# Patient Record
Sex: MALE | Race: WHITE | NOT HISPANIC OR LATINO | URBAN - METROPOLITAN AREA
[De-identification: names, ages, dates, MRNs, and addresses within clinical notes are randomized per-mention and may not be internally consistent; named-entity substitution may affect disease eponyms.]

---

## 2023-04-07 ENCOUNTER — LAB (OUTPATIENT)
Dept: LAB | Facility: LAB | Age: 48
End: 2023-04-07
Payer: COMMERCIAL

## 2023-04-07 ENCOUNTER — OFFICE VISIT (OUTPATIENT)
Dept: PRIMARY CARE | Facility: CLINIC | Age: 48
End: 2023-04-07
Payer: COMMERCIAL

## 2023-04-07 VITALS
HEART RATE: 88 BPM | DIASTOLIC BLOOD PRESSURE: 81 MMHG | BODY MASS INDEX: 40.32 KG/M2 | WEIGHT: 266 LBS | SYSTOLIC BLOOD PRESSURE: 145 MMHG | HEIGHT: 68 IN

## 2023-04-07 DIAGNOSIS — Z00.00 PHYSICAL EXAM: ICD-10-CM

## 2023-04-07 DIAGNOSIS — G89.29 CHRONIC BILATERAL LOW BACK PAIN WITHOUT SCIATICA: Primary | ICD-10-CM

## 2023-04-07 DIAGNOSIS — M54.50 CHRONIC BILATERAL LOW BACK PAIN WITHOUT SCIATICA: ICD-10-CM

## 2023-04-07 DIAGNOSIS — M62.830 LUMBAR PARASPINAL MUSCLE SPASM: ICD-10-CM

## 2023-04-07 DIAGNOSIS — Z13.29 SCREENING FOR THYROID DISORDER: ICD-10-CM

## 2023-04-07 DIAGNOSIS — Z12.11 SCREENING FOR COLON CANCER: ICD-10-CM

## 2023-04-07 DIAGNOSIS — E66.01 MORBIDLY OBESE (MULTI): ICD-10-CM

## 2023-04-07 DIAGNOSIS — M54.9 MID BACK PAIN: ICD-10-CM

## 2023-04-07 DIAGNOSIS — Z13.89 SCREENING FOR OBESITY: ICD-10-CM

## 2023-04-07 DIAGNOSIS — M54.50 CHRONIC BILATERAL LOW BACK PAIN WITHOUT SCIATICA: Primary | ICD-10-CM

## 2023-04-07 DIAGNOSIS — G89.29 CHRONIC BILATERAL LOW BACK PAIN WITHOUT SCIATICA: ICD-10-CM

## 2023-04-07 DIAGNOSIS — Z00.00 PHYSICAL EXAM: Primary | ICD-10-CM

## 2023-04-07 LAB
ALANINE AMINOTRANSFERASE (SGPT) (U/L) IN SER/PLAS: 22 U/L (ref 10–52)
ALBUMIN (G/DL) IN SER/PLAS: 4.1 G/DL (ref 3.4–5)
ALKALINE PHOSPHATASE (U/L) IN SER/PLAS: 65 U/L (ref 33–120)
ANION GAP IN SER/PLAS: 11 MMOL/L (ref 10–20)
ASPARTATE AMINOTRANSFERASE (SGOT) (U/L) IN SER/PLAS: 15 U/L (ref 9–39)
BILIRUBIN TOTAL (MG/DL) IN SER/PLAS: 0.6 MG/DL (ref 0–1.2)
CALCIUM (MG/DL) IN SER/PLAS: 9.4 MG/DL (ref 8.6–10.6)
CARBON DIOXIDE, TOTAL (MMOL/L) IN SER/PLAS: 30 MMOL/L (ref 21–32)
CHLORIDE (MMOL/L) IN SER/PLAS: 104 MMOL/L (ref 98–107)
CHOLESTEROL (MG/DL) IN SER/PLAS: 197 MG/DL (ref 0–199)
CHOLESTEROL IN HDL (MG/DL) IN SER/PLAS: 33 MG/DL
CHOLESTEROL/HDL RATIO: 6
CREATININE (MG/DL) IN SER/PLAS: 0.86 MG/DL (ref 0.5–1.3)
GFR MALE: >90 ML/MIN/1.73M2
GLUCOSE (MG/DL) IN SER/PLAS: 84 MG/DL (ref 74–99)
LDL: 133 MG/DL (ref 0–99)
POTASSIUM (MMOL/L) IN SER/PLAS: 4.1 MMOL/L (ref 3.5–5.3)
PROTEIN TOTAL: 7.5 G/DL (ref 6.4–8.2)
SODIUM (MMOL/L) IN SER/PLAS: 141 MMOL/L (ref 136–145)
TRIGLYCERIDE (MG/DL) IN SER/PLAS: 155 MG/DL (ref 0–149)
UREA NITROGEN (MG/DL) IN SER/PLAS: 11 MG/DL (ref 6–23)
VLDL: 31 MG/DL (ref 0–40)

## 2023-04-07 PROCEDURE — 36415 COLL VENOUS BLD VENIPUNCTURE: CPT

## 2023-04-07 PROCEDURE — 84443 ASSAY THYROID STIM HORMONE: CPT

## 2023-04-07 PROCEDURE — 80053 COMPREHEN METABOLIC PANEL: CPT

## 2023-04-07 PROCEDURE — 99215 OFFICE O/P EST HI 40 MIN: CPT | Performed by: INTERNAL MEDICINE

## 2023-04-07 PROCEDURE — 80061 LIPID PANEL: CPT

## 2023-04-07 PROCEDURE — 84153 ASSAY OF PSA TOTAL: CPT

## 2023-04-07 PROCEDURE — 99417 PROLNG OP E/M EACH 15 MIN: CPT | Performed by: INTERNAL MEDICINE

## 2023-04-07 RX ORDER — METHOCARBAMOL 750 MG/1
750 TABLET, FILM COATED ORAL DAILY PRN
Qty: 15 TABLET | Refills: 2 | Status: SHIPPED | OUTPATIENT
Start: 2023-04-07

## 2023-04-07 RX ORDER — METHOCARBAMOL 750 MG/1
750 TABLET, FILM COATED ORAL DAILY PRN
COMMUNITY
Start: 2023-01-23 | End: 2023-04-07 | Stop reason: SDUPTHER

## 2023-04-07 ASSESSMENT — ENCOUNTER SYMPTOMS
CONSTITUTIONAL NEGATIVE: 1
PARESTHESIAS: 0
NUMBNESS: 0
PARESIS: 0
BACK PAIN: 1
ABDOMINAL PAIN: 0
BOWEL INCONTINENCE: 0

## 2023-04-07 NOTE — PROGRESS NOTES
"Patient ID: Kirby Hameed is a 47 y.o. male who presents for Annual Exam (cpe).    /81   Pulse 88   Ht 1.727 m (5' 8\")   Wt 121 kg (266 lb)   BMI 40.45 kg/m²     Back Pain  This is a chronic problem. The current episode started more than 1 year ago (for the last 25 yrs). The problem has been waxing and waning since onset. The pain is present in the lumbar spine and thoracic spine. The quality of the pain is described as aching (like spasm , pressure , heaviness). The pain does not radiate. Pain scale: pain scale ranges from 3-10 /10. Pain severity now: mild to severe. The symptoms are aggravated by bending, standing and position. Stiffness is present In the morning. Pertinent negatives include no abdominal pain, bladder incontinence, bowel incontinence, numbness, paresis or paresthesias.         Pt morbidly obese   Have not taken any   Measures to work on his   Overweight         Subjective     Review of Systems   Constitutional: Negative.    Gastrointestinal:  Negative for abdominal pain and bowel incontinence.   Genitourinary:  Negative for bladder incontinence.   Musculoskeletal:  Positive for back pain.   Neurological:  Negative for numbness and paresthesias.   All other systems reviewed and are negative.      Objective     Physical Exam  Vitals and nursing note reviewed.   Constitutional:       Appearance: Normal appearance. He is obese.   Neck:      Vascular: No carotid bruit.   Cardiovascular:      Rate and Rhythm: Normal rate and regular rhythm.      Pulses: Normal pulses.      Heart sounds: Normal heart sounds. No murmur heard.  Pulmonary:      Effort: Pulmonary effort is normal.      Breath sounds: Normal breath sounds.   Abdominal:      Palpations: There is no mass.      Hernia: No hernia is present.   Musculoskeletal:      Cervical back: No rigidity or tenderness.      Right lower leg: No edema.      Left lower leg: No edema.      Comments: Lumbar flexion/extension painful and limited   No " LUMBAR SPINE tenderness  Slr rt+lt normal  Hip normal      Neurological:      Mental Status: He is alert.         Lab Results   Component Value Date    WBC 5.3 05/31/2022    HGB 12.8 (L) 05/31/2022    HCT 37.5 (L) 05/31/2022    MCV 93 05/31/2022     05/31/2022           Problem List Items Addressed This Visit          Musculoskeletal    Lumbar paraspinal muscle spasm       Other    Screening for obesity    Chronic bilateral low back pain without sciatica    Relevant Medications    methocarbamol (Robaxin) 750 mg tablet    Other Relevant Orders    XR lumbar spine complete 4+ views    Referral to Physical Therapy    Mid back pain    Relevant Medications    methocarbamol (Robaxin) 750 mg tablet    Other Relevant Orders    XR thoracic spine complete 4+ views     Other Visit Diagnoses       Physical exam    -  Primary    Relevant Orders    Comprehensive metabolic panel    Lipid panel    Prostate Spec.Ag,Screen    Cologuard® colon cancer screening    Morbidly obese (CMS/HCC)        Relevant Orders    Referral to Nutrition Services    Screening for thyroid disorder        Relevant Orders    Tsh With Reflex To Free T4 If Abnormal    Screening for colon cancer        Relevant Orders    Cologuard® colon cancer screening             A/P         MOIST HEAT TO THE AFFECTED AREA ON THE BACK   WITH MICROWAVE HEATING PAD   BACK STRETCHING EXERCISES  TOLERATED   WHEN PAIN IS NOT SEVERE   CAN USE VOLTAREN GEL 1% AND RUB THIS TOPICALLY ON THE AFFECTED AREA ON THE BACK   TYLENOL 500MG 1 PILL THREE TIMES A DAY ROUND THE CLOCK  NEEDED FOR PAIN   DEMONSTRATED HOW TO STRETCH BACK   METHOCARBAMOL 750MG 1 PILL BID  NEEDED FOR MUSCLE SPASM   DISCUSSED THE IMPORTANCE OF LOOSING WEIGHT   Discussed the importance of obesity on overall physical and mental wellbeing  People are overweight they are at risk for malignancies  People are overweight they have a high risk to develop back pain which can be chronic knee pain and hip pain  They are  at risk for sleep apnea they are at risk for fatal heart arrhythmias  They are also at risk for stroke hypertension diabetes  Will get CMP, lipid, PSA, TSH  Referral to have him seen nutritionist  X-ray lumbar spine  X-ray thoracic spine  Cologuard for colon cancer screening

## 2023-04-08 LAB
PROSTATE SPECIFIC ANTIGEN,SCREEN: 0.93 NG/ML (ref 0–4)
THYROTROPIN (MIU/L) IN SER/PLAS BY DETECTION LIMIT <= 0.05 MIU/L: 1.67 MIU/L (ref 0.44–3.98)

## 2023-04-13 ENCOUNTER — TELEPHONE (OUTPATIENT)
Dept: PRIMARY CARE | Facility: CLINIC | Age: 48
End: 2023-04-13
Payer: COMMERCIAL

## 2023-04-13 NOTE — TELEPHONE ENCOUNTER
----- Message from Nathalia Narayanan MD sent at 4/13/2023  8:38 AM EDT -----  And back x-ray shows arthritis and that very well can explain his pain around the mid upper back in the neck region and lower back I am placing a referral for the physical therapy he will be called with the date time and place where he is going to get that done

## 2023-04-18 ENCOUNTER — TELEPHONE (OUTPATIENT)
Dept: PRIMARY CARE | Facility: CLINIC | Age: 48
End: 2023-04-18
Payer: COMMERCIAL

## 2023-04-18 NOTE — TELEPHONE ENCOUNTER
----- Message from Nathalia Narayanan MD sent at 4/16/2023  7:20 PM EDT -----  Lower back x-ray and neck x-ray shows arthritis he has a physical therapy referral tell him to follow-up with physical therapy

## 2023-05-27 LAB — NONINV COLON CA DNA+OCC BLD SCRN STL QL: NEGATIVE

## 2023-05-31 ENCOUNTER — TELEPHONE (OUTPATIENT)
Dept: PRIMARY CARE | Facility: CLINIC | Age: 48
End: 2023-05-31
Payer: COMMERCIAL

## 2023-05-31 NOTE — TELEPHONE ENCOUNTER
----- Message from Rowan Hernandez LPN sent at 5/31/2023  2:14 PM EDT -----  Left message for patient to return call.

## 2023-06-08 ENCOUNTER — TELEPHONE (OUTPATIENT)
Dept: PRIMARY CARE | Facility: CLINIC | Age: 48
End: 2023-06-08
Payer: COMMERCIAL

## 2023-09-07 ENCOUNTER — TELEPHONE (OUTPATIENT)
Dept: PRIMARY CARE | Facility: CLINIC | Age: 48
End: 2023-09-07
Payer: COMMERCIAL

## 2023-09-07 NOTE — TELEPHONE ENCOUNTER
Pt. Requesting a letter stating it is ok for him to return to work. States he tested positive for Covid on 9/3/23. His symptoms started on Saturday. He had a cough, sneezing, fatigue, and headache. He needs something from his doctor stating he can go back to work. Please advise.

## 2023-09-08 NOTE — TELEPHONE ENCOUNTER
Spoke with patient and advised of message   Pt. Agrees. Asking if he can have a work note stating he may return to work on 9/12/23.

## 2024-05-16 ENCOUNTER — APPOINTMENT (OUTPATIENT)
Dept: PRIMARY CARE | Facility: CLINIC | Age: 49
End: 2024-05-16
Payer: COMMERCIAL

## 2024-06-06 ENCOUNTER — LAB (OUTPATIENT)
Dept: LAB | Facility: LAB | Age: 49
End: 2024-06-06
Payer: COMMERCIAL

## 2024-06-06 ENCOUNTER — OFFICE VISIT (OUTPATIENT)
Dept: PRIMARY CARE | Facility: CLINIC | Age: 49
End: 2024-06-06
Payer: COMMERCIAL

## 2024-06-06 VITALS
BODY MASS INDEX: 42.53 KG/M2 | HEIGHT: 67 IN | SYSTOLIC BLOOD PRESSURE: 136 MMHG | WEIGHT: 271 LBS | OXYGEN SATURATION: 98 % | HEART RATE: 88 BPM | DIASTOLIC BLOOD PRESSURE: 80 MMHG

## 2024-06-06 DIAGNOSIS — Z13.29 SCREENING FOR THYROID DISORDER: ICD-10-CM

## 2024-06-06 DIAGNOSIS — Z13.89 SCREENING FOR OBESITY: ICD-10-CM

## 2024-06-06 DIAGNOSIS — G47.33 OBSTRUCTIVE SLEEP APNEA: ICD-10-CM

## 2024-06-06 DIAGNOSIS — Z00.00 ANNUAL PHYSICAL EXAM: ICD-10-CM

## 2024-06-06 DIAGNOSIS — Z00.00 ANNUAL PHYSICAL EXAM: Primary | ICD-10-CM

## 2024-06-06 DIAGNOSIS — E66.01 CLASS 3 SEVERE OBESITY DUE TO EXCESS CALORIES WITHOUT SERIOUS COMORBIDITY WITH BODY MASS INDEX (BMI) OF 40.0 TO 44.9 IN ADULT (MULTI): ICD-10-CM

## 2024-06-06 PROBLEM — E66.813 CLASS 3 SEVERE OBESITY DUE TO EXCESS CALORIES WITHOUT SERIOUS COMORBIDITY WITH BODY MASS INDEX (BMI) OF 40.0 TO 44.9 IN ADULT: Status: ACTIVE | Noted: 2024-06-06

## 2024-06-06 LAB
ALBUMIN SERPL BCP-MCNC: 4.2 G/DL (ref 3.4–5)
ALP SERPL-CCNC: 53 U/L (ref 33–120)
ALT SERPL W P-5'-P-CCNC: 32 U/L (ref 10–52)
ANION GAP SERPL CALC-SCNC: 13 MMOL/L (ref 10–20)
AST SERPL W P-5'-P-CCNC: 19 U/L (ref 9–39)
BILIRUB SERPL-MCNC: 0.4 MG/DL (ref 0–1.2)
BUN SERPL-MCNC: 9 MG/DL (ref 6–23)
CALCIUM SERPL-MCNC: 9.4 MG/DL (ref 8.6–10.6)
CHLORIDE SERPL-SCNC: 102 MMOL/L (ref 98–107)
CHOLEST SERPL-MCNC: 194 MG/DL (ref 0–199)
CHOLESTEROL/HDL RATIO: 5.7
CO2 SERPL-SCNC: 29 MMOL/L (ref 21–32)
CREAT SERPL-MCNC: 0.93 MG/DL (ref 0.5–1.3)
EGFRCR SERPLBLD CKD-EPI 2021: >90 ML/MIN/1.73M*2
GLUCOSE SERPL-MCNC: 117 MG/DL (ref 74–99)
HDLC SERPL-MCNC: 34 MG/DL
LDLC SERPL CALC-MCNC: 122 MG/DL
NON HDL CHOLESTEROL: 160 MG/DL (ref 0–149)
POTASSIUM SERPL-SCNC: 4.3 MMOL/L (ref 3.5–5.3)
PROT SERPL-MCNC: 7.6 G/DL (ref 6.4–8.2)
PSA SERPL-MCNC: 1.12 NG/ML
SODIUM SERPL-SCNC: 140 MMOL/L (ref 136–145)
TRIGL SERPL-MCNC: 192 MG/DL (ref 0–149)
TSH SERPL-ACNC: 1.98 MIU/L (ref 0.44–3.98)
VLDL: 38 MG/DL (ref 0–40)

## 2024-06-06 PROCEDURE — 3008F BODY MASS INDEX DOCD: CPT | Performed by: INTERNAL MEDICINE

## 2024-06-06 PROCEDURE — 99396 PREV VISIT EST AGE 40-64: CPT | Performed by: INTERNAL MEDICINE

## 2024-06-06 PROCEDURE — 1036F TOBACCO NON-USER: CPT | Performed by: INTERNAL MEDICINE

## 2024-06-06 PROCEDURE — 80061 LIPID PANEL: CPT

## 2024-06-06 PROCEDURE — 84153 ASSAY OF PSA TOTAL: CPT

## 2024-06-06 PROCEDURE — 36415 COLL VENOUS BLD VENIPUNCTURE: CPT

## 2024-06-06 PROCEDURE — 84443 ASSAY THYROID STIM HORMONE: CPT

## 2024-06-06 PROCEDURE — 80053 COMPREHEN METABOLIC PANEL: CPT

## 2024-06-06 ASSESSMENT — ENCOUNTER SYMPTOMS
CONSTITUTIONAL NEGATIVE: 1
SLEEP DISTURBANCE: 1

## 2024-06-06 ASSESSMENT — PATIENT HEALTH QUESTIONNAIRE - PHQ9
SUM OF ALL RESPONSES TO PHQ9 QUESTIONS 1 AND 2: 0
1. LITTLE INTEREST OR PLEASURE IN DOING THINGS: NOT AT ALL
2. FEELING DOWN, DEPRESSED OR HOPELESS: NOT AT ALL

## 2024-06-06 NOTE — PROGRESS NOTES
"Patient ID: Kirby Hameed III is a 49 y.o. male who presents for Annual Exam.    /80   Pulse 88   Ht 1.702 m (5' 7\")   Wt 123 kg (271 lb)   SpO2 98%   BMI 42.44 kg/m²     HPI      I AM HERE FOR CHECK UP       NO COMPLAINTS     NO CONCERNS     I AM GAINING WEIGHT   MAY BE I AM EATING TOO   MUCH , AND NOT BURNING ENOUGH   CALORIES         PT HAS SLEEP APNEA   HE CANNOT AFFORD   CPAP THERAPY   SINCE HIS INSURANCE DOES   NOT COVER THE THERAPY         Subjective     Review of Systems   Constitutional: Negative.    Psychiatric/Behavioral:  Positive for sleep disturbance.    All other systems reviewed and are negative.      Objective     Physical Exam  Vitals and nursing note reviewed.   Constitutional:       Appearance: He is obese.   Neck:      Vascular: No carotid bruit.   Cardiovascular:      Rate and Rhythm: Normal rate and regular rhythm.      Pulses: Normal pulses.      Heart sounds: Normal heart sounds. No murmur heard.  Pulmonary:      Effort: Pulmonary effort is normal.      Breath sounds: Normal breath sounds.   Abdominal:      Palpations: There is no mass.      Tenderness: There is no guarding or rebound.      Hernia: No hernia is present.      Comments: OBESE    Musculoskeletal:      Right lower leg: No edema.      Left lower leg: No edema.   Skin:     Capillary Refill: Capillary refill takes more than 3 seconds.   Neurological:      General: No focal deficit present.      Mental Status: He is oriented to person, place, and time. Mental status is at baseline.   Psychiatric:         Mood and Affect: Mood normal.         Behavior: Behavior normal.         Thought Content: Thought content normal.         Judgment: Judgment normal.         Lab Results   Component Value Date    WBC 5.3 05/31/2022    HGB 12.8 (L) 05/31/2022    HCT 37.5 (L) 05/31/2022    MCV 93 05/31/2022     05/31/2022           Problem List Items Addressed This Visit       Screening for obesity    Class 3 severe obesity due to " excess calories without serious comorbidity with body mass index (BMI) of 40.0 to 44.9 in adult (Multi)    Relevant Orders    Referral to Endocrinology    Obstructive sleep apnea    Relevant Orders    Referral to Endocrinology     Other Visit Diagnoses       Annual physical exam    -  Primary    Relevant Orders    Comprehensive metabolic panel    Lipid panel    Prostate Spec.Ag,Screen    Screening for thyroid disorder        Relevant Orders    Tsh With Reflex To Free T4 If Abnormal                 A/P         CBC,LIPID,PSA, TSH   [facility]   FOLLOW UP IF NEEDED

## 2024-10-09 ENCOUNTER — APPOINTMENT (OUTPATIENT)
Dept: ENDOCRINOLOGY | Facility: CLINIC | Age: 49
End: 2024-10-09
Payer: COMMERCIAL

## 2024-12-19 ENCOUNTER — TELEMEDICINE (OUTPATIENT)
Dept: PRIMARY CARE | Facility: CLINIC | Age: 49
End: 2024-12-19

## 2024-12-19 DIAGNOSIS — R68.2 DRY MOUTH: ICD-10-CM

## 2024-12-19 DIAGNOSIS — Z13.1 SCREENING FOR DIABETES MELLITUS: ICD-10-CM

## 2024-12-19 DIAGNOSIS — R35.0 URINARY FREQUENCY: Primary | Chronic | ICD-10-CM

## 2024-12-19 PROCEDURE — 99212 OFFICE O/P EST SF 10 MIN: CPT | Performed by: INTERNAL MEDICINE

## 2024-12-19 PROCEDURE — 1036F TOBACCO NON-USER: CPT | Performed by: INTERNAL MEDICINE

## 2024-12-19 NOTE — PROGRESS NOTES
Patient ID: Kirby Hameed III is a 49 y.o. male who presents for No chief complaint on file..    HPI     This is a telephone appointment with the patient at the distant site and the provider at the originating site    Telehealth visit with the patient    He is having dry mouth  He is having urinary frequency  And very thirsty    He would like to get checked for diabetes      Review of Systems:  Constitutional: No fever or chills  Cardiovascular: no chest pain, no palpitations and no syncope.   Respiratory: no cough, no shortness of breath during exertion and no shortness of breath at rest.   Gastrointestinal: no abdominal pain, no nausea and no vomiting.  Neuro: No Headache, no dizziness    Physical Exam:   Constitutional: Alert and in no acute distress. Well developed, well nourished.   Head and Face: Head and face: Normal.     Eyes: Normal external exam.    Ears, Nose, Mouth, and Throat: External inspection of ears and nose: Normal.  Hearing: Normal.   Neck: No neck mass was observed. Supple.  Pulmonary: No respiratory distress.    Musculoskeletal: Range of motion: Normal.     Skin: Normal skin color and pigmentation, normal skin turgor, and no rash.    Neurologic: Coordination: Normal.    Psychiatric: Judgment and insight: Intact. Mood and affect: Normal.    Lab Results   Component Value Date    WBC 5.3 05/31/2022    HGB 12.8 (L) 05/31/2022    HCT 37.5 (L) 05/31/2022     05/31/2022    CHOL 194 06/06/2024    TRIG 192 (H) 06/06/2024    HDL 34.0 06/06/2024    ALT 32 06/06/2024    AST 19 06/06/2024     06/06/2024    K 4.3 06/06/2024     06/06/2024    CREATININE 0.93 06/06/2024    BUN 9 06/06/2024    CO2 29 06/06/2024    TSH 1.98 06/06/2024    PSA 0.89 05/31/2022    INR 1.1 03/25/2021       Electrocardiogram  EKG Results: An in office Electrocardiogram was performed. The interpretion is: A fast rhythm Sinus Tachycardia. LINDA Monteiro DO      Assessment/Plan   Diagnoses and all orders for this  visit:  Urinary frequency  Dry mouth  Screening for diabetes mellitus    1.       UA, CMP, A1C   Will follow through the test results    This Medical recommendation has been made based on the Telephonic/Video conversation and the history is given by the patient, which I as a Physician and the patient also understand that there are limitations given the lack of an in-person Physical Exam. Patient will call back if symptoms don't improve.    Your yearly Physical is due in:   When you call the office for your yearly Physical, please ask them to inform me to order your blood work, so that you can get the fasting blood work before your appointment and we can discuss the results at your physical.      A Doctor is always available by phone when the office is closed. Please feel free to call for help with any problem that you feel shouldn't wait until the office re-opens.     Nathalia Narayanan MD

## 2024-12-20 ENCOUNTER — LAB (OUTPATIENT)
Dept: LAB | Facility: LAB | Age: 49
End: 2024-12-20
Payer: COMMERCIAL

## 2024-12-20 DIAGNOSIS — Z13.1 SCREENING FOR DIABETES MELLITUS: ICD-10-CM

## 2024-12-20 DIAGNOSIS — R35.0 URINARY FREQUENCY: Chronic | ICD-10-CM

## 2024-12-20 LAB
ALBUMIN SERPL BCP-MCNC: 4.3 G/DL (ref 3.4–5)
ALP SERPL-CCNC: 86 U/L (ref 33–120)
ALT SERPL W P-5'-P-CCNC: 32 U/L (ref 10–52)
ANION GAP SERPL CALC-SCNC: 16 MMOL/L (ref 10–20)
APPEARANCE UR: CLEAR
AST SERPL W P-5'-P-CCNC: 24 U/L (ref 9–39)
BILIRUB SERPL-MCNC: 0.7 MG/DL (ref 0–1.2)
BILIRUB UR STRIP.AUTO-MCNC: NEGATIVE MG/DL
BUN SERPL-MCNC: 8 MG/DL (ref 6–23)
CALCIUM SERPL-MCNC: 9.7 MG/DL (ref 8.6–10.6)
CHLORIDE SERPL-SCNC: 96 MMOL/L (ref 98–107)
CO2 SERPL-SCNC: 28 MMOL/L (ref 21–32)
COLOR UR: COLORLESS
CREAT SERPL-MCNC: 1.04 MG/DL (ref 0.5–1.3)
EGFRCR SERPLBLD CKD-EPI 2021: 87 ML/MIN/1.73M*2
EST. AVERAGE GLUCOSE BLD GHB EST-MCNC: 200 MG/DL
GLUCOSE SERPL-MCNC: 455 MG/DL (ref 74–99)
GLUCOSE UR STRIP.AUTO-MCNC: ABNORMAL MG/DL
HBA1C MFR BLD: 8.6 %
KETONES UR STRIP.AUTO-MCNC: NEGATIVE MG/DL
LEUKOCYTE ESTERASE UR QL STRIP.AUTO: NEGATIVE
NITRITE UR QL STRIP.AUTO: NEGATIVE
PH UR STRIP.AUTO: 5.5 [PH]
POTASSIUM SERPL-SCNC: 4.2 MMOL/L (ref 3.5–5.3)
PROT SERPL-MCNC: 8.8 G/DL (ref 6.4–8.2)
PROT UR STRIP.AUTO-MCNC: NEGATIVE MG/DL
RBC # UR STRIP.AUTO: NEGATIVE /UL
SODIUM SERPL-SCNC: 136 MMOL/L (ref 136–145)
SP GR UR STRIP.AUTO: 1.02
UROBILINOGEN UR STRIP.AUTO-MCNC: NORMAL MG/DL

## 2024-12-20 PROCEDURE — 83036 HEMOGLOBIN GLYCOSYLATED A1C: CPT

## 2024-12-20 PROCEDURE — 80053 COMPREHEN METABOLIC PANEL: CPT

## 2024-12-20 PROCEDURE — 81003 URINALYSIS AUTO W/O SCOPE: CPT

## 2024-12-20 PROCEDURE — 36415 COLL VENOUS BLD VENIPUNCTURE: CPT

## 2024-12-26 ENCOUNTER — LAB (OUTPATIENT)
Dept: LAB | Facility: LAB | Age: 49
End: 2024-12-26
Payer: COMMERCIAL

## 2024-12-26 ENCOUNTER — OFFICE VISIT (OUTPATIENT)
Dept: PRIMARY CARE | Facility: CLINIC | Age: 49
End: 2024-12-26
Payer: COMMERCIAL

## 2024-12-26 VITALS
OXYGEN SATURATION: 92 % | HEIGHT: 67 IN | WEIGHT: 264.2 LBS | HEART RATE: 109 BPM | DIASTOLIC BLOOD PRESSURE: 93 MMHG | SYSTOLIC BLOOD PRESSURE: 156 MMHG | BODY MASS INDEX: 41.47 KG/M2

## 2024-12-26 DIAGNOSIS — E66.01 CLASS 3 SEVERE OBESITY DUE TO EXCESS CALORIES WITH SERIOUS COMORBIDITY AND BODY MASS INDEX (BMI) OF 40.0 TO 44.9 IN ADULT: ICD-10-CM

## 2024-12-26 DIAGNOSIS — K57.32 DIVERTICULITIS OF SIGMOID COLON: ICD-10-CM

## 2024-12-26 DIAGNOSIS — R10.32 LEFT LOWER QUADRANT ABDOMINAL PAIN: ICD-10-CM

## 2024-12-26 DIAGNOSIS — E78.5 HYPERLIPIDEMIA, UNSPECIFIED HYPERLIPIDEMIA TYPE: ICD-10-CM

## 2024-12-26 DIAGNOSIS — B37.0 THRUSH: ICD-10-CM

## 2024-12-26 DIAGNOSIS — E11.69 TYPE 2 DIABETES MELLITUS WITH OTHER SPECIFIED COMPLICATION, WITHOUT LONG-TERM CURRENT USE OF INSULIN: Primary | Chronic | ICD-10-CM

## 2024-12-26 DIAGNOSIS — Z13.29 SCREENING FOR THYROID DISORDER: ICD-10-CM

## 2024-12-26 DIAGNOSIS — E66.813 CLASS 3 SEVERE OBESITY DUE TO EXCESS CALORIES WITH SERIOUS COMORBIDITY AND BODY MASS INDEX (BMI) OF 40.0 TO 44.9 IN ADULT: ICD-10-CM

## 2024-12-26 DIAGNOSIS — E11.69 TYPE 2 DIABETES MELLITUS WITH OTHER SPECIFIED COMPLICATION, WITHOUT LONG-TERM CURRENT USE OF INSULIN: ICD-10-CM

## 2024-12-26 PROBLEM — K62.5 RECTAL BLEEDING: Status: ACTIVE | Noted: 2024-12-26

## 2024-12-26 PROCEDURE — 1036F TOBACCO NON-USER: CPT | Performed by: INTERNAL MEDICINE

## 2024-12-26 PROCEDURE — 3077F SYST BP >= 140 MM HG: CPT | Performed by: INTERNAL MEDICINE

## 2024-12-26 PROCEDURE — 4010F ACE/ARB THERAPY RXD/TAKEN: CPT | Performed by: INTERNAL MEDICINE

## 2024-12-26 PROCEDURE — 3008F BODY MASS INDEX DOCD: CPT | Performed by: INTERNAL MEDICINE

## 2024-12-26 PROCEDURE — 3080F DIAST BP >= 90 MM HG: CPT | Performed by: INTERNAL MEDICINE

## 2024-12-26 PROCEDURE — 82570 ASSAY OF URINE CREATININE: CPT

## 2024-12-26 PROCEDURE — 82043 UR ALBUMIN QUANTITATIVE: CPT

## 2024-12-26 PROCEDURE — 99215 OFFICE O/P EST HI 40 MIN: CPT | Performed by: INTERNAL MEDICINE

## 2024-12-26 PROCEDURE — 3052F HG A1C>EQUAL 8.0%<EQUAL 9.0%: CPT | Performed by: INTERNAL MEDICINE

## 2024-12-26 PROCEDURE — 3049F LDL-C 100-129 MG/DL: CPT | Performed by: INTERNAL MEDICINE

## 2024-12-26 RX ORDER — METRONIDAZOLE 500 MG/1
500 TABLET ORAL 3 TIMES DAILY
Qty: 42 TABLET | Refills: 0 | Status: SHIPPED | OUTPATIENT
Start: 2024-12-26 | End: 2024-12-26

## 2024-12-26 RX ORDER — LISINOPRIL 20 MG/1
20 TABLET ORAL DAILY
Qty: 100 TABLET | Refills: 3 | Status: SHIPPED | OUTPATIENT
Start: 2024-12-26 | End: 2026-01-30

## 2024-12-26 RX ORDER — LOVASTATIN 40 MG/1
40 TABLET ORAL DAILY
Qty: 30 TABLET | Refills: 5 | Status: SHIPPED | OUTPATIENT
Start: 2024-12-26 | End: 2025-06-24

## 2024-12-26 RX ORDER — METFORMIN HYDROCHLORIDE 500 MG/1
500 TABLET, EXTENDED RELEASE ORAL
Qty: 180 TABLET | Refills: 3 | Status: SHIPPED | OUTPATIENT
Start: 2024-12-26 | End: 2025-03-26

## 2024-12-26 RX ORDER — NYSTATIN 100000 [USP'U]/ML
500000 SUSPENSION ORAL 4 TIMES DAILY
Qty: 200 ML | Refills: 0 | Status: SHIPPED | OUTPATIENT
Start: 2024-12-26 | End: 2025-01-05

## 2024-12-26 RX ORDER — CIPROFLOXACIN 500 MG/1
500 TABLET ORAL 2 TIMES DAILY
Qty: 20 TABLET | Refills: 0 | Status: SHIPPED | OUTPATIENT
Start: 2024-12-26 | End: 2025-01-05

## 2024-12-26 RX ORDER — METRONIDAZOLE 500 MG/1
500 TABLET ORAL 3 TIMES DAILY
Qty: 30 TABLET | Refills: 0 | Status: SHIPPED | OUTPATIENT
Start: 2024-12-26 | End: 2025-01-05

## 2024-12-26 RX ORDER — METRONIDAZOLE 500 MG/1
500 TABLET ORAL 3 TIMES DAILY
Qty: 30 TABLET | Refills: 0 | Status: SHIPPED | OUTPATIENT
Start: 2024-12-26 | End: 2024-12-26

## 2024-12-26 ASSESSMENT — ENCOUNTER SYMPTOMS
CONSTIPATION: 0
DIARRHEA: 0
NAUSEA: 0
RECTAL PAIN: 0
VOMITING: 0
ROS GI COMMENTS: RECTAL BLEEDING
ABDOMINAL PAIN: 1
CONSTITUTIONAL NEGATIVE: 1

## 2024-12-26 ASSESSMENT — PATIENT HEALTH QUESTIONNAIRE - PHQ9
1. LITTLE INTEREST OR PLEASURE IN DOING THINGS: NOT AT ALL
2. FEELING DOWN, DEPRESSED OR HOPELESS: NOT AT ALL
1. LITTLE INTEREST OR PLEASURE IN DOING THINGS: NOT AT ALL
SUM OF ALL RESPONSES TO PHQ9 QUESTIONS 1 AND 2: 0
2. FEELING DOWN, DEPRESSED OR HOPELESS: NOT AT ALL
SUM OF ALL RESPONSES TO PHQ9 QUESTIONS 1 AND 2: 0

## 2024-12-26 NOTE — PROGRESS NOTES
"Patient ID: Kirby Hameed III is a 49 y.o. male who presents for Diabetes (Diabetes follow-up.).    BP (!) 156/93   Pulse 109   Ht 1.702 m (5' 7\")   Wt 120 kg (264 lb 3.2 oz)   SpO2 92%   BMI 41.38 kg/m²     HPI      Patient is here for multiple concerns      He is having dry mouth  He is having excessive thirst  He is having problem with his vision  Recent blood test shows he has diabetes  A1c is 8.6, random blood sugar 455   From recent blood work done on 12/20/2024  Urine shows glucose 4+  He does not have any tingling or numbness in his feet      Patient is having also  Sore feeling in his tongue  This has been going on for few days      He is also having intermittent rectal bleeding  He has a history of diverticulitis  He is having some discomfort left lower abdomen  Appetite is good, no nausea, no vomiting  No fever, no chills          Subjective     Review of Systems   Constitutional: Negative.    HENT:          Tongue thrush    Gastrointestinal:  Positive for abdominal pain. Negative for constipation, diarrhea, nausea, rectal pain and vomiting.        Rectal bleeding    All other systems reviewed and are negative.  P    Objective     Physical Exam  Vitals and nursing note reviewed.   Constitutional:       Appearance: He is obese.   HENT:      Mouth/Throat:      Comments: Tongue thrush   Neck:      Vascular: No carotid bruit.   Cardiovascular:      Rate and Rhythm: Normal rate and regular rhythm.      Pulses: Normal pulses.      Heart sounds: Normal heart sounds.   Pulmonary:      Effort: Pulmonary effort is normal.      Breath sounds: Normal breath sounds.   Abdominal:      General: Bowel sounds are normal.      Palpations: There is no mass.      Tenderness: There is abdominal tenderness.      Comments: Slight tenderness left lower quadrant   No rebound , no rigidity   Slight guarding    Musculoskeletal:      Right lower leg: No edema.      Left lower leg: No edema.   Neurological:      General: No " focal deficit present.      Mental Status: He is oriented to person, place, and time. Mental status is at baseline.   Psychiatric:         Mood and Affect: Mood normal.         Behavior: Behavior normal.         Thought Content: Thought content normal.         Judgment: Judgment normal.         Lab Results   Component Value Date    WBC 5.3 05/31/2022    HGB 12.8 (L) 05/31/2022    HCT 37.5 (L) 05/31/2022    MCV 93 05/31/2022     05/31/2022           Problem List Items Addressed This Visit    None  Visit Diagnoses       Type 2 diabetes mellitus with other specified complication, without long-term current use of insulin  (Chronic)   -  Primary    Relevant Medications    metFORMIN XR (Glucophage-XR) 500 mg 24 hr tablet    lisinopril 20 mg tablet    lovastatin (Mevacor) 40 mg tablet    Other Relevant Orders    Referral to Nutrition Services    Referral to Ophthalmology    Referral to Endocrinology    Albumin-Creatinine Ratio, Urine Random    Class 3 severe obesity due to excess calories with serious comorbidity and body mass index (BMI) of 40.0 to 44.9 in adult        Relevant Orders    Referral to Endocrinology    Hyperlipidemia, unspecified hyperlipidemia type        Relevant Orders    Lipid panel    Screening for thyroid disorder        Relevant Orders    Tsh With Reflex To Free T4 If Abnormal    Thrush        Relevant Medications    nystatin (Mycostatin) 100,000 unit/mL suspension    Left lower quadrant abdominal pain        Relevant Orders    XR abdomen 2 views w chest 1 view    C-reactive protein    Sedimentation Rate    CBC and Auto Differential    CT abdomen pelvis w IV contrast    Diverticulitis of sigmoid colon        Relevant Medications    ciprofloxacin (Cipro) 500 mg tablet    metroNIDAZOLE (Flagyl) 500 mg tablet              A/P      CBC, sed rate, CRP, TSH  Urine for microalbumin  Abdomen with chest 1 view  Placed referral for nutritionist  Placed referral for endocrinology  Placed referral for  ophthalmology  CT scan abdomen and pelvis with IV contrast  I told him he needs to take more liquid  More bland diet, avoid any steaks and vegetables for the time being, take more mechanical soft  I told him he needs to cut back sodas Pepsi pop  Any sugar containing drink  I am starting him on metformin 500 mg extended release 1 tablet twice daily, prescription authorized  Lisinopril 20 mg 1 tablet every day prescription authorized  Lovastatin 20 mg 1 tablet every day prescription authorized  Cipro 500 mg 1 tablet twice daily for 10 days  Metronidazole 500 mg 3 times a day for 14 days  Nystatin suspension, use directed prescription authorized  If abdominal pain gets worse to attend emergency right away   Discussed the effect of morbid obesity on overall all-cause mortality  Discussed the effect of morbid obesity and physical mental wellbeing  Discussed the effect of morbid obesity and hypertension,diabetes, obstructive sleep apnea fatal arrhythmias and sudden death  Discussed the effect of morbid obesity on cardiovascular cerebrovascular health  Discussed the effect of morbid obesity on depression  Discussed the effect of morbid obesity and kidney health  I told him he needs to cut back total calories intake and total portion size

## 2024-12-27 LAB
CREAT UR-MCNC: 50.7 MG/DL (ref 20–370)
MICROALBUMIN UR-MCNC: <7 MG/L
MICROALBUMIN/CREAT UR: NORMAL MG/G{CREAT}

## 2025-01-03 ENCOUNTER — TELEPHONE (OUTPATIENT)
Dept: PRIMARY CARE | Facility: CLINIC | Age: 50
End: 2025-01-03
Payer: COMMERCIAL

## 2025-01-09 ENCOUNTER — APPOINTMENT (OUTPATIENT)
Dept: RADIOLOGY | Facility: CLINIC | Age: 50
End: 2025-01-09
Payer: COMMERCIAL

## 2025-01-16 ENCOUNTER — TELEPHONE (OUTPATIENT)
Dept: PRIMARY CARE | Facility: CLINIC | Age: 50
End: 2025-01-16
Payer: COMMERCIAL

## 2025-01-16 DIAGNOSIS — B37.0 ORAL THRUSH: Primary | ICD-10-CM

## 2025-01-16 RX ORDER — NYSTATIN 100000 [USP'U]/ML
500000 SUSPENSION ORAL 4 TIMES DAILY
Qty: 280 ML | Refills: 0 | Status: SHIPPED | OUTPATIENT
Start: 2025-01-16 | End: 2025-01-26

## 2025-01-20 PROBLEM — E11.9 TYPE 2 DIABETES MELLITUS, WITHOUT LONG-TERM CURRENT USE OF INSULIN (MULTI): Status: ACTIVE | Noted: 2025-01-20

## 2025-01-20 NOTE — PROGRESS NOTES
"Nutrition Initial Assessment:     Patient Kirby Hameed III is a 49 y.o. male being seen via virtual visit, phone call only who was referred by Nathalia Narayanan MD  on 12/26/2024 for   1. Type 2 diabetes mellitus with other specified complication, without long-term current use of insulin            Nutrition Assessment    Patient Active Problem List   Diagnosis    Body mass index 40.0-44.9, adult (Multi)    Screening for obesity    Chronic bilateral low back pain without sciatica    Mid back pain    Lumbar paraspinal muscle spasm    Class 3 severe obesity due to excess calories without serious comorbidity with body mass index (BMI) of 40.0 to 44.9 in adult    Obstructive sleep apnea    Dry mouth    Urinary frequency    Rectal bleeding    Type 2 diabetes mellitus, without long-term current use of insulin (Multi)     Nutrition History:  Food & Nutrition History: Pt reports recent diagnosis of DM and needs guidance on healthy eating.  Food Allergies: no  Food Intolerances: cheese and milk  Vitamin/mineral intake: Multivitamin     GI Symptoms: none; Occurring >2 weeks? no  Oral Problems: denies; Dentition: own  Sleep Habits: 7+ hrs continuous    Diet Recall:  Meal 1: 830-9 am: hard boiled egg, banana  Meal 2: 1130 am: leftovers from dinner, sandwhich  Meal 3: 6;00pm, pasta, pizza, chicken, mashed potatoes, fish  Snacks: nuts, chips, apple  Food Variety: Present  Oral Nutrition Supplement Use:  (none)  Fluid Intake: sweet tea, lemonade, juice, water  Energy Intake: Good > 75 %      Food Preparation:  Cooking: Patient  Grocery Shopping: Patient  Dining Out: 1 to 3 times a week    Physical Activity:   mostly sedentary    Food Security/Insecurity: Food / Nutrition Program Participation:  (none)    Anthropometrics:  Height: 170 cm (5' 6.93\")   Weight: 120 kg (264 lb 8.8 oz)   BMI (Calculated): 41.52                 Weight History:   Daily Weight  01/21/25 : 120 kg (264 lb 8.8 oz)  12/26/24 : 120 kg (264 lb 3.2 " oz)  06/06/24 : 123 kg (271 lb)  05/24/24 : 111 kg (244 lb 14.9 oz)  09/09/23 : 107 kg (235 lb 0.2 oz)  04/07/23 : 121 kg (266 lb)  05/31/22 : 121 kg (265 lb 12.8 oz)  09/14/21 : 113 kg (250 lb)  07/08/21 : 118 kg (260 lb 6.4 oz)  03/26/21 : 119 kg (262 lb)    Weight Change %: weight stable       Nutrition Focused Physical Exam Findings:  Deferred, appointment is either virtual or phone only            Nutrition Significant Labs:  Last Chem:     Chemistry    Lab Results   Component Value Date/Time     12/20/2024 1102    K 4.2 12/20/2024 1102    CL 96 (L) 12/20/2024 1102    CO2 28 12/20/2024 1102    BUN 8 12/20/2024 1102    CREATININE 1.04 12/20/2024 1102    Lab Results   Component Value Date/Time    CALCIUM 9.7 12/20/2024 1102    ALKPHOS 86 12/20/2024 1102    AST 24 12/20/2024 1102    ALT 32 12/20/2024 1102    BILITOT 0.7 12/20/2024 1102       , CMP Trend:    Recent Labs     12/20/24  1102 06/06/24  1708 04/07/23  1208   GLUCOSE 455* 117* 84    140 141   K 4.2 4.3 4.1   CL 96* 102 104   CO2 28 29 30   ANIONGAP 16 13 11   BUN 8 9 11   CREATININE 1.04 0.93 0.86   EGFR 87 >90  --    CALCIUM 9.7 9.4 9.4   ALBUMIN 4.3 4.2 4.1   ALKPHOS 86 53 65   PROT 8.8* 7.6 7.5   AST 24 19 15   BILITOT 0.7 0.4 0.6   ALT 32 32 22   , CBC Trend:   Recent Labs     05/31/22  1010 12/08/21  1214 03/26/21  1703   WBC 5.3 9.2 8.4   NRBC 0.0  --   --    RBC 4.02* 4.04* 4.09*   HGB 12.8* 12.5* 12.9*   HCT 37.5* 37.4* 39.3*   MCV 93 93 96   MCHC 34.1 33.4 32.8   RDW 11.9 12.6 12.0    295 286   , RFP + Serum Mag Trend:   Recent Labs     12/20/24  1102 06/06/24  1708 04/07/23  1208 03/26/21  1703 03/25/21  0031   GLUCOSE 455* 117* 84   < > 99    140 141   < > 137   K 4.2 4.3 4.1   < > 3.6   CL 96* 102 104   < > 105   CO2 28 29 30   < > 27   ANIONGAP 16 13 11   < > 9*   BUN 8 9 11   < > 9   CREATININE 1.04 0.93 0.86   < > 0.84   EGFR 87 >90  --   --   --    CALCIUM 9.7 9.4 9.4   < > 9.2   PHOS  --   --   --   --  3.3  "  ALBUMIN 4.3 4.2 4.1   < > 4.2   MG  --   --   --   --  1.84    < > = values in this interval not displayed.   , LFT Trend:   Recent Labs     12/20/24  1102 06/06/24  1708 04/07/23  1208   ALBUMIN 4.3 4.2 4.1   BILITOT 0.7 0.4 0.6   ALKPHOS 86 53 65   ALT 32 32 22   AST 24 19 15   PROT 8.8* 7.6 7.5   , DM Specific Labs Trend (includes HgbA1C):   Recent Labs     12/20/24  1102   HGBA1C 8.6*   , Lipid Panel Trend:    Recent Labs     06/06/24  1708 04/07/23  1208 05/31/22  1010   CHOL 194 197 171   HDL 34.0 33.0* 30.3*   LDLCALC 122*  --   --    LDLF  --  133* 121*   VLDL 38 31 20   TRIG 192* 155* 100   , Iron Panel + Serum Ferritin Trend: No results for input(s): \"IRON\", \"UIBC\", \"TIBC\", \"IRONSAT\", \"FERRITIN\" in the last 14082 hours., Vitamin B12: No results found for: \"ODZTGTSF86\" , Folate: No results found for: \"FOLATE\" , and Vitamin D: No results found for: \"VITD25\"     Medications:  Current Outpatient Medications   Medication Instructions    lisinopril 20 mg, oral, Daily    lovastatin (MEVACOR) 40 mg, oral, Daily    metFORMIN XR (GLUCOPHAGE-XR) 500 mg, oral, 2 times daily (morning and late afternoon), Do not crush, chew, or split.    methocarbamol (ROBAXIN) 750 mg, oral, Daily PRN    nystatin (MYCOSTATIN) 500,000 Units, oral, 4 times daily, Swish in mouth and swallow.        Estimated Needs:  We did not discuss estimated needs in today's visit.          Nutrition Diagnosis   Malnutrition Diagnosis  Patient has Malnutrition Diagnosis: No    Nutrition Diagnosis  Patient has Nutrition Diagnosis: Yes  Diagnosis Status (1): New  Nutrition Diagnosis 1: Food and nutrition related knowledge deficit  Related to (1): limited or lack of prior nutrition-related education  As Evidenced by (1): per pt report looking for guidance on healthy eating patterns, intake of unbalanced meals and snacks per diet recall       Nutrition Interventions/Recommendations   Nutrition Prescription: General healthy diet with focus on CHO modified " diet for DM management    Nutrition Interventions:   Food and Nutrient Delivery: Meals & Snacks: Energy-modified diet, General Healthful Diet, Carbohydrate-modified diet, Modify Composition of Meals/Snacks  Goals: Work towards 3 balanced meals per day using plate method & 1-2 balanced snacks a day     Coordination of Care:   None    Nutrition Education:   Nutrition Education Content: Content related nutrition education  Goals: Balanced meals using plate method, timing of meals, reviewed basics of what is a CHO and portion sizes of CHO food for meal planning, reviewed normal glucose metabolism and what happens to glucose metabolism with DM    Nutrition Education Topics Discussed:   Provided Keys for a Healthy Lifestyle Handout. Discussed the following:  Start a daily exercise routine. Adults should target 150 minutes of moderate intensity exercise each week. Start slow and work your way up to this amount. Provided examples of aerobic, resistance, and stretching/balance activities.  Plan balanced meals. The “Plate Method” is a tool used to plan balanced meals. Aim for the following:  One-third to half the plate (or the meal) should be a non-starchy vegetable. Non-starchy vegetables include broccoli, cauliflower, salad greens, carrots, cucumbers, asparagus, green beans, tomatoes, and many more.  One-third to a quarter of the plate should be a lean protein. Lean proteins include chicken, turkey, fish, lean beef, eggs, nuts, tofu.  One third to a quarter of the plate can be a complex starch or carbohydrate. Examples of complex starches / carbohydrates include starchy vegetables (potatoes, peas, corn, and butternut squash), grains (whole wheat bread, quinoa, and brown rice), legumes (lentils and beans), and fruit.  Olive oil should be the primary fat source used for cooking.  Use a 9-10 inch plate to help manage portions  Pre-plan meal ideas. Spending time planning upfront saves you from relying on convenience foods. It  "can also help you save money! Your plan does not need to be rigid, but you should have some idea of what meals you are going to make going into the week. Place this information on the refrigerator in plain sight. There are many products you can purchase to help keep you organized.   Stay hydrated with water or other lower calorie beverages. We often confuse our body's signal for thirst with being hungry. Make sure you are staying hydrated, preferably with water. The best indicator of hydration is your urine. It should be a pale yellow. Provided examples of sugar-free beverages and ways to flavor water.   Pay attention to your body's hunger and fullness cues. Introduced patient to using a scale of 1-10 to rate hunger / satiety. Reviewed signs of hunger that patient might or might not feel, and encouraged being attentive to these signals if they are present. Encouraged patient to eat when feeling a \"3-4\" on the scale instead of waiting for hunger to become more severe. Encouraged patient to aim to stop eating when feeling at a \"6\" (satisfied, but could eat a little more) or a \"7\" (full but not uncomfortable). Recommended eating slowly and checking in with body to determine when body is really full. Discussed that it takes the brain around 10-15 minutes after eating to feel full. Encouraged using this method in conjunction with balanced foods on the plate using the Plate Method.    Discussed importance of consistent meal pattern   Discussed how eating consistently and balanced meals help improve satiety, boot metabolism and helps to improve blood glucose levels   Encouraged pt to eat first meal of the day within 1-2hrs after waking up to help boost metabolism   Encouraged meals and snacks to be  throughout the day with no more than a 3-4hr gap in between to help boost metabolism   Fasting B-130 mg/dL;  Postprandial BG: less than 180 mg/dL;  A1c: less than 7%    Educational Handouts Provided: Keys for a " Healthy Lifestyle Handout, ADA Plate Method,  Balanced Breakfast, High Protein Meal Ideas, and High Protein Snack Ideas    Nutrition Counseling: Strategies: Nutrition counseling based on motivational interviewing strategy, Nutrition counseling based on goal setting strategy      Readiness to Change : Good  Level of Understanding : Good  Anticipated Compliant : Good         Nutrition Monitoring and Evaluation   Food/Nutrient Related History Monitoring  Monitoring and Evaluation Plan: Meal/snack pattern  Meal/Snack Pattern: Estimated meal and snack pattern  Criteria: Work towards 3 balanced meals per day using plate method & 1-2 balanced snacks a day         Body Composition/Growth/Weight History  Monitoring and Evaluation Plan: Weight  Weight: Measured weight  Criteria: Intentional weight loss of 0.5-2 lb per week, trending toward a clinically significant weight loss of 5-10% of current body weight.    Biochemical Data, Medical Tests and Procedures  Monitoring and Evaluation Plan: Glucose/endocrine profile  Glucose/Endocrine Profile: Hemoglobin A1c (HgbA1c)  Criteria: <7%                 Follow Up: 6-8 weeks

## 2025-01-21 ENCOUNTER — TELEMEDICINE CLINICAL SUPPORT (OUTPATIENT)
Dept: NUTRITION | Facility: HOSPITAL | Age: 50
End: 2025-01-21
Payer: COMMERCIAL

## 2025-01-21 VITALS — BODY MASS INDEX: 41.52 KG/M2 | HEIGHT: 67 IN | WEIGHT: 264.55 LBS

## 2025-01-21 DIAGNOSIS — E11.69 TYPE 2 DIABETES MELLITUS WITH OTHER SPECIFIED COMPLICATION, WITHOUT LONG-TERM CURRENT USE OF INSULIN: Primary | ICD-10-CM

## 2025-01-21 PROCEDURE — 97802 MEDICAL NUTRITION INDIV IN: CPT

## 2025-01-21 PROCEDURE — 97802 MEDICAL NUTRITION INDIV IN: CPT | Mod: 95

## 2025-01-28 ENCOUNTER — APPOINTMENT (OUTPATIENT)
Dept: RADIOLOGY | Facility: HOSPITAL | Age: 50
End: 2025-01-28
Payer: COMMERCIAL

## 2025-02-06 ENCOUNTER — APPOINTMENT (OUTPATIENT)
Dept: ENDOCRINOLOGY | Facility: CLINIC | Age: 50
End: 2025-02-06
Payer: COMMERCIAL

## 2025-02-06 VITALS — BODY MASS INDEX: 35.55 KG/M2 | HEIGHT: 69 IN | WEIGHT: 240 LBS

## 2025-02-06 DIAGNOSIS — E66.813 CLASS 3 SEVERE OBESITY DUE TO EXCESS CALORIES WITHOUT SERIOUS COMORBIDITY WITH BODY MASS INDEX (BMI) OF 40.0 TO 44.9 IN ADULT: ICD-10-CM

## 2025-02-06 DIAGNOSIS — E78.5 HYPERLIPIDEMIA, UNSPECIFIED HYPERLIPIDEMIA TYPE: ICD-10-CM

## 2025-02-06 DIAGNOSIS — E11.69 TYPE 2 DIABETES MELLITUS WITH OTHER SPECIFIED COMPLICATION, WITHOUT LONG-TERM CURRENT USE OF INSULIN: Primary | Chronic | ICD-10-CM

## 2025-02-06 DIAGNOSIS — I10 HYPERTENSION, UNSPECIFIED TYPE: ICD-10-CM

## 2025-02-06 DIAGNOSIS — E66.01 CLASS 3 SEVERE OBESITY DUE TO EXCESS CALORIES WITHOUT SERIOUS COMORBIDITY WITH BODY MASS INDEX (BMI) OF 40.0 TO 44.9 IN ADULT: ICD-10-CM

## 2025-02-06 PROCEDURE — 4010F ACE/ARB THERAPY RXD/TAKEN: CPT | Performed by: NURSE PRACTITIONER

## 2025-02-06 PROCEDURE — 99204 OFFICE O/P NEW MOD 45 MIN: CPT | Performed by: NURSE PRACTITIONER

## 2025-02-06 PROCEDURE — 3008F BODY MASS INDEX DOCD: CPT | Performed by: NURSE PRACTITIONER

## 2025-02-06 PROCEDURE — 1036F TOBACCO NON-USER: CPT | Performed by: NURSE PRACTITIONER

## 2025-02-06 RX ORDER — LOSARTAN POTASSIUM 50 MG/1
50 TABLET ORAL DAILY
Qty: 90 TABLET | Refills: 1 | Status: SHIPPED | OUTPATIENT
Start: 2025-02-06 | End: 2026-02-06

## 2025-02-06 NOTE — ASSESSMENT & PLAN NOTE
Orders:    Referral to Endocrinology    Comment: A1c not at goal.  Newly diagnosed with type 2 diabetes.  He was not given a glucometer so he does not know what his blood sugar is.  Discussed normal ranges however he is interested in using CGM to check his blood sugar.  Will refer to pharmacy team to have placed with short-term follow-up to review patterns.  He has made significant changes to his diet.  He is also lost 30 pounds since his diagnosis.  I congratulated him on this today    Plan:  Continue metformin 500 mg twice a day with meals  Start CGM with Pharm.D.  Short-term follow-up with Pharm.D. for pattern management  Follow-up with me 3 to 6 months

## 2025-02-06 NOTE — ASSESSMENT & PLAN NOTE
Orders:    Referral to Endocrinology    Follow up in Endocrinology Pharmacy; Future    Follow up in Endocrinology Pharmacy; Future    Follow Up In Endocrinology; Future    Follow Up In Endocrinology; Future

## 2025-02-06 NOTE — Clinical Note
I put in the follow ups but wanted to make sure he got scheduled with Will or Ebne first available.  He needs a 330 or 4pm time.  Then he will also need a second appt two weeks after this for CGM download.  Thank you.   Please arrange virtual appt with me in soonest available?  May? And then 6 months in person please.  Thank you

## 2025-02-06 NOTE — PROGRESS NOTES
Subjective   Patient is sent at the request of Dr. Narayanan for my opinion regarding Type 2 diabetes.  My final recommendations will be communicated back to the requesting provider by way of shared medical record.     Kirby Hameed III is a 49 y.o. male here today for a new patient visit regarding Type 2 diabetes. Initial diagnosis with diabetes was 12/2024.  The patient has a family history grandparents, aunts, and uncles.    Known complications include: HTN, Hyperlipidemia     A1c 8.6% is 12/2/2024.  No previous A1c on file   Here today to establish care   Initially had polyuria, polydipsia, nocturia  This has improved since starting metformin   He is having trouble with vision   Reports this has changed multiple time   Now it worse than it has been   He is a  and having difficulty seeing.    Talked to dietician   Changed his meals    Changed his breakfast   Changed his portion sizes   Cut out sugar free calorie free beverages, drinking more water.    Lost 30 lbs since December   Previously above 260 lbs.        Current diabetes regimen is as follows:   Metformin  mg twice daily     The patient is not currently checking the blood glucose at this time.  He was not prescribed a meter.     Hypoglycemia frequency: Denies  Hypoglycemia awareness: yes     Regarding symptoms of hyperglycemia, the patient is not experiencing symptoms such as polydipsia, polyuria, and nocturia.  These has improved and are gone at this time.   Vision is worse recently. He has upcoming appt with eye doctor on Monday.   Fatigue has improved.  The patient comes into the office today wanting to understand what his blood sugars is.        ROS   General: no fever, chills.  Weight see HPI  Skin: no rashes, pruritis or dry skin  Eyes: no blurred or double vision or eye pain  Cardiac: denies chest pain, heart palpitations or orthopnea  Pulmonary: denies wheezing, productive cough or exertional dyspnea  GI: denies nausea, vomiting,  "diarrhea or constipation  Neuro: denies numbness/tingling in hands or feet, denies seizures  Musc: denies history of upper or lower extremity weakness  Endocrine: see HPI  Hematology: Negative for anemia, easy bleeding and bruising.    Objective    Physical Exam  Height 1.753 m (5' 9\"), weight 109 kg (240 lb).  Unable to obtain blood pressure today due to virtual visit  General Appearance: Well appearing, alert, in no acute distress, well-hydrated, well nourished.  Affect: alert, cooperative         Current Outpatient Medications:     metFORMIN XR (Glucophage-XR) 500 mg 24 hr tablet, Take 1 tablet (500 mg) by mouth 2 times daily (morning and late afternoon). Do not crush, chew, or split., Disp: 180 tablet, Rfl: 3      Lab Results   Component Value Date    BILITOT 0.7 12/20/2024    CALCIUM 9.7 12/20/2024    CO2 28 12/20/2024    CL 96 (L) 12/20/2024    CREATININE 1.04 12/20/2024    GLUCOSE 455 (HH) 12/20/2024    ALKPHOS 86 12/20/2024    K 4.2 12/20/2024    PROT 8.8 (H) 12/20/2024     12/20/2024    AST 24 12/20/2024    ALT 32 12/20/2024    BUN 8 12/20/2024    ANIONGAP 16 12/20/2024    MG 1.84 03/25/2021    PHOS 3.3 03/25/2021    ALBUMIN 4.3 12/20/2024    LIPASE 48 12/08/2021    GFRMALE >90 04/07/2023     Lab Results   Component Value Date    TRIG 192 (H) 06/06/2024    CHOL 194 06/06/2024    LDLCALC 122 (H) 06/06/2024    HDL 34.0 06/06/2024     Lab Results   Component Value Date    HGBA1C 8.6 (H) 12/20/2024       The 10-year ASCVD risk score (Erick MERIDA, et al., 2019) is: 14.2%    Values used to calculate the score:      Age: 49 years      Sex: Male      Is Non- : No      Diabetic: Yes      Tobacco smoker: No      Systolic Blood Pressure: 156 mmHg      Is BP treated: No      HDL Cholesterol: 34 mg/dL      Total Cholesterol: 194 mg/dL    Assessment & Plan  Type 2 diabetes mellitus with other specified complication, without long-term current use of insulin    Orders:    Referral to " Endocrinology    Follow up in Endocrinology Pharmacy; Future    Follow up in Endocrinology Pharmacy; Future    Follow Up In Endocrinology; Future    Follow Up In Endocrinology; Future    Class 3 severe obesity due to excess calories without serious comorbidity with body mass index (BMI) of 40.0 to 44.9 in adult    Orders:    Referral to Endocrinology    Comment: A1c not at goal.  Newly diagnosed with type 2 diabetes.  He was not given a glucometer so he does not know what his blood sugar is.  Discussed normal ranges however he is interested in using CGM to check his blood sugar.  Will refer to pharmacy team to have placed with short-term follow-up to review patterns.  He has made significant changes to his diet.  He is also lost 30 pounds since his diagnosis.  I congratulated him on this today    Plan:  Continue metformin 500 mg twice a day with meals  Start CGM with Pharm.D.  Short-term follow-up with Pharm.D. for pattern management  Follow-up with me 3 to 6 months    Hypertension, unspecified type    Orders:    losartan (Cozaar) 50 mg tablet; Take 1 tablet (50 mg) by mouth once daily.    Follow up in Endocrinology Pharmacy; Future    Follow up in Endocrinology Pharmacy; Future    Comment: Initially started on lisinopril but he was feeling dizzy.  Unclear if it was related to starting multiple medications at 1 time or high blood sugars however he stopped this medication.  At his last visit blood pressure was not at goal and discussed goal today.  Will recommend a blood pressure check at his next visit.  Willing to change to losartan which may be a better option given he is an -American with diabetes    Plan:  Start losartan 50 mg once daily  Get blood pressure check with appointment with pharmacy    Hyperlipidemia, unspecified hyperlipidemia type       Comment: Started on lovastatin but also was having symptoms so he stopped everything.  Given diabetes would recommend at least moderate intensity statin like  atorvastatin 20 mg or rosuvastatin 10 mg once daily.  Will allow him time to start losartan and make sure he is tolerating before adding an additional medication.  Discussed the risks of cardiovascular disease associated with diabetes    Plan:   Will discuss further at future appointments

## 2025-02-06 NOTE — Clinical Note
Hi there- You saw this patient back in January.  He has made significant changes.  I am going to put a CGM on him and have him meet with our pharmacy team for med adjustments if needed.  Could he plan to follow up with you end of March?  Can you please help him with an appt?  Thanks.   Alee Gaytan, LATANYA-CNP

## 2025-02-07 ENCOUNTER — TELEPHONE (OUTPATIENT)
Dept: ENDOCRINOLOGY | Facility: CLINIC | Age: 50
End: 2025-02-07
Payer: COMMERCIAL

## 2025-02-07 NOTE — TELEPHONE ENCOUNTER
----- Message from Alee Gaytan sent at 2/6/2025 12:35 PM EST -----  I put in the follow ups but wanted to make sure he got scheduled with Gareth or Bharathi first available.  He needs a 330 or 4pm time.  Then he will also need a second appt two weeks after this for CGM download.  Thank you.     Please arrange virtual appt with me in soonest available?  May? And then 6 months in person please.  Thank you

## 2025-02-10 ENCOUNTER — APPOINTMENT (OUTPATIENT)
Dept: OPHTHALMOLOGY | Facility: CLINIC | Age: 50
End: 2025-02-10
Payer: COMMERCIAL

## 2025-02-12 ENCOUNTER — OFFICE VISIT (OUTPATIENT)
Dept: OPHTHALMOLOGY | Facility: CLINIC | Age: 50
End: 2025-02-12
Payer: COMMERCIAL

## 2025-02-12 DIAGNOSIS — H52.4 HYPEROPIA OF BOTH EYES WITH ASTIGMATISM AND PRESBYOPIA: Primary | ICD-10-CM

## 2025-02-12 DIAGNOSIS — E11.9 TYPE 2 DIABETES MELLITUS WITHOUT COMPLICATION, WITHOUT LONG-TERM CURRENT USE OF INSULIN (MULTI): ICD-10-CM

## 2025-02-12 DIAGNOSIS — H52.03 HYPEROPIA OF BOTH EYES WITH ASTIGMATISM AND PRESBYOPIA: Primary | ICD-10-CM

## 2025-02-12 DIAGNOSIS — H52.203 HYPEROPIA OF BOTH EYES WITH ASTIGMATISM AND PRESBYOPIA: Primary | ICD-10-CM

## 2025-02-12 PROCEDURE — 92004 COMPRE OPH EXAM NEW PT 1/>: CPT | Performed by: OPTOMETRIST

## 2025-02-12 PROCEDURE — 92015 DETERMINE REFRACTIVE STATE: CPT | Performed by: OPTOMETRIST

## 2025-02-12 ASSESSMENT — REFRACTION_MANIFEST
OD_CYLINDER: -0.50
OS_SPHERE: +2.00
OD_CYLINDER: -0.50
OD_AXIS: 070
OS_SPHERE: +2.00
OD_AXIS: 070
OD_SPHERE: +2.00
METHOD_AUTOREFRACTION: 1
OS_ADD: +2.00
OD_SPHERE: +2.00
OD_SPHERE: +2.25
OS_ADD: +2.00
METHOD_AUTOREFRACTION: 1
OD_CYLINDER: -0.50
OS_SPHERE: +2.00
OD_AXIS: 070
OD_ADD: +2.00
OD_ADD: +2.00

## 2025-02-12 ASSESSMENT — CUP TO DISC RATIO
OS_RATIO: 0.25
OD_RATIO: 0.25

## 2025-02-12 ASSESSMENT — ENCOUNTER SYMPTOMS
RESPIRATORY NEGATIVE: 0
NEUROLOGICAL NEGATIVE: 0
HEMATOLOGIC/LYMPHATIC NEGATIVE: 0
ENDOCRINE NEGATIVE: 0
MUSCULOSKELETAL NEGATIVE: 0
EYES NEGATIVE: 0
CONSTITUTIONAL NEGATIVE: 0
ALLERGIC/IMMUNOLOGIC NEGATIVE: 0
CARDIOVASCULAR NEGATIVE: 0
GASTROINTESTINAL NEGATIVE: 0
PSYCHIATRIC NEGATIVE: 0

## 2025-02-12 ASSESSMENT — TONOMETRY
OS_IOP_MMHG: 18
IOP_METHOD: GOLDMANN APPLANATION
OD_IOP_MMHG: 17

## 2025-02-12 ASSESSMENT — EXTERNAL EXAM - RIGHT EYE: OD_EXAM: NORMAL

## 2025-02-12 ASSESSMENT — REFRACTION_WEARINGRX
OD_CYLINDER: -0.25
OS_SPHERE: +1.75
OD_SPHERE: +2.00
OD_AXIS: 070

## 2025-02-12 ASSESSMENT — CONF VISUAL FIELD
OS_INFERIOR_NASAL_RESTRICTION: 0
OS_INFERIOR_TEMPORAL_RESTRICTION: 0
OD_INFERIOR_TEMPORAL_RESTRICTION: 0
OS_NORMAL: 1
OD_SUPERIOR_TEMPORAL_RESTRICTION: 0
OD_NORMAL: 1
OD_INFERIOR_NASAL_RESTRICTION: 0
OD_SUPERIOR_NASAL_RESTRICTION: 0
OS_SUPERIOR_TEMPORAL_RESTRICTION: 0
OS_SUPERIOR_NASAL_RESTRICTION: 0

## 2025-02-12 ASSESSMENT — SLIT LAMP EXAM - LIDS
COMMENTS: NORMAL
COMMENTS: NORMAL

## 2025-02-12 ASSESSMENT — VISUAL ACUITY
CORRECTION_TYPE: GLASSES
METHOD: SNELLEN - LINEAR
OS_CC+: -1
OD_CC: 20/25
OS_CC: 20/25

## 2025-02-12 ASSESSMENT — EXTERNAL EXAM - LEFT EYE: OS_EXAM: NORMAL

## 2025-02-12 NOTE — ASSESSMENT & PLAN NOTE
Mild change from habitual Rx.   Pt educated on findings and far-sightedness. Release Rx for FTW. Discussed adaptation. Also discussed options for +4.00D OTC reader for near only - occupation specific. Monitor annually. Pt voiced understanding.

## 2025-02-12 NOTE — PROGRESS NOTES
Assessment/Plan   Problem List Items Addressed This Visit       Type 2 diabetes mellitus, without long-term current use of insulin (Multi)     No diabetic retinopathy OU. No macular edema OU.  Pt educated on findings and importance of maintaining a low A1C and FBS to prevent vision loss from diabetic retinopathy. Continue care with PCP as directed. Monitor annually with DFE. Pt voiced understanding.         Hyperopia of both eyes with astigmatism and presbyopia - Primary     Mild change from habitual Rx.   Pt educated on findings and far-sightedness. Release Rx for FTW. Discussed adaptation. Also discussed options for +4.00D OTC reader for near only - occupation specific. Monitor annually. Pt voiced understanding.

## 2025-02-12 NOTE — ASSESSMENT & PLAN NOTE
No diabetic retinopathy OU. No macular edema OU.  Pt educated on findings and importance of maintaining a low A1C and FBS to prevent vision loss from diabetic retinopathy. Continue care with PCP as directed. Monitor annually with DFE. Pt voiced understanding.

## 2025-02-18 ENCOUNTER — APPOINTMENT (OUTPATIENT)
Dept: ENDOCRINOLOGY | Facility: CLINIC | Age: 50
End: 2025-02-18
Payer: COMMERCIAL

## 2025-03-07 ENCOUNTER — APPOINTMENT (OUTPATIENT)
Dept: ENDOCRINOLOGY | Facility: CLINIC | Age: 50
End: 2025-03-07
Payer: COMMERCIAL

## 2025-03-24 ENCOUNTER — APPOINTMENT (OUTPATIENT)
Dept: NUTRITION | Facility: HOSPITAL | Age: 50
End: 2025-03-24
Payer: COMMERCIAL

## 2025-06-18 ENCOUNTER — TELEPHONE (OUTPATIENT)
Dept: ENDOCRINOLOGY | Facility: CLINIC | Age: 50
End: 2025-06-18
Payer: COMMERCIAL

## 2025-06-18 NOTE — TELEPHONE ENCOUNTER
lvm televisit appt reminder/ check which cgm patient is using or is patient checking bs if so they should have meter available for the televisit. Office phone number provided for call back.

## 2025-06-19 ENCOUNTER — APPOINTMENT (OUTPATIENT)
Dept: ENDOCRINOLOGY | Facility: CLINIC | Age: 50
End: 2025-06-19
Payer: COMMERCIAL

## 2025-06-19 VITALS — BODY MASS INDEX: 35.55 KG/M2 | HEIGHT: 69 IN | WEIGHT: 240 LBS

## 2025-06-19 DIAGNOSIS — E66.9 OBESITY (BMI 30-39.9): ICD-10-CM

## 2025-06-19 DIAGNOSIS — I10 HYPERTENSION, UNSPECIFIED TYPE: ICD-10-CM

## 2025-06-19 DIAGNOSIS — E11.69 TYPE 2 DIABETES MELLITUS WITH OTHER SPECIFIED COMPLICATION, WITHOUT LONG-TERM CURRENT USE OF INSULIN: Primary | Chronic | ICD-10-CM

## 2025-06-19 PROCEDURE — 99213 OFFICE O/P EST LOW 20 MIN: CPT | Performed by: NURSE PRACTITIONER

## 2025-06-19 PROCEDURE — 1036F TOBACCO NON-USER: CPT | Performed by: NURSE PRACTITIONER

## 2025-06-19 PROCEDURE — 4010F ACE/ARB THERAPY RXD/TAKEN: CPT | Performed by: NURSE PRACTITIONER

## 2025-06-19 PROCEDURE — 3008F BODY MASS INDEX DOCD: CPT | Performed by: NURSE PRACTITIONER

## 2025-06-19 ASSESSMENT — PATIENT HEALTH QUESTIONNAIRE - PHQ9
2. FEELING DOWN, DEPRESSED OR HOPELESS: NOT AT ALL
1. LITTLE INTEREST OR PLEASURE IN DOING THINGS: NOT AT ALL
SUM OF ALL RESPONSES TO PHQ9 QUESTIONS 1 AND 2: 0

## 2025-06-19 ASSESSMENT — ENCOUNTER SYMPTOMS
LOSS OF SENSATION IN FEET: 0
DEPRESSION: 0
OCCASIONAL FEELINGS OF UNSTEADINESS: 0

## 2025-06-19 ASSESSMENT — PAIN SCALES - GENERAL: PAINLEVEL_OUTOF10: 0-NO PAIN

## 2025-06-19 ASSESSMENT — LIFESTYLE VARIABLES
HOW MANY STANDARD DRINKS CONTAINING ALCOHOL DO YOU HAVE ON A TYPICAL DAY: 1 OR 2
AUDIT-C TOTAL SCORE: 1
HOW OFTEN DO YOU HAVE A DRINK CONTAINING ALCOHOL: MONTHLY OR LESS
HOW OFTEN DO YOU HAVE SIX OR MORE DRINKS ON ONE OCCASION: NEVER
SKIP TO QUESTIONS 9-10: 1

## 2025-06-19 NOTE — ASSESSMENT & PLAN NOTE
Orders:    Follow Up In Endocrinology    Hemoglobin A1c; Future    Lipid Panel Non-Fasting; Future    Basic metabolic panel; Future    Follow Up In Endocrinology; Future

## 2025-06-19 NOTE — PROGRESS NOTES
"Subjective   Kirby Hameed III is a 50 y.o. male here today for a follow up visit regarding Type 2 diabetes. Initial diagnosis with diabetes was 12/2024.  The patient has a family history grandparents, aunts, and uncles.    Known complications include: HTN, Hyperlipidemia     A1c 8.6% is 12/2/2024.  No previous A1c on file   Initially had polyuria, polydipsia, nocturia  This has improved since starting metformin   He is having trouble with vision   He is a  and having difficulty seeing.    Talked to dietician   Changed his meals    Changed his breakfast   Changed his portion sizes   Cut out sugar free calorie free beverages, drinking more water.    Lost 30 lbs since December   Previously above 260 lbs.      Since last visit, he did not show for two pharmacy appt   Did not get a chance to wear CGM   He does not have glucometer       Current diabetes regimen is as follows:   Metformin  mg twice daily     The patient is not currently checking the blood glucose at this time.  He does not have a glucometer.  Never met with PharmD for CGM trial.     Hypoglycemia frequency: Denies   Hypoglycemia awareness: yes     The patient comes into the office today wanting to reschedule appt for CGM.        ROS   General: no fever, chills or acute changes in weight in the last 6 months  Skin: no rashes, pruritis or dry skin  Cardiac: denies chest pain, heart palpitations or orthopnea  Pulmonary: denies wheezing, productive cough or exertional dyspnea        Objective    Physical Exam  Height 1.753 m (5' 9\"), weight 109 kg (240 lb).  Unable to obtain blood pressure today due to virtual visit  General Appearance: Well appearing, alert, in no acute distress, well-hydrated, well nourished.  Affect: alert, cooperative       Medications Ordered Prior to Encounter[1]        Lab Results   Component Value Date    BILITOT 0.7 12/20/2024    CALCIUM 9.7 12/20/2024    CO2 28 12/20/2024    CL 96 (L) 12/20/2024    CREATININE 1.04 " 12/20/2024    GLUCOSE 455 (HH) 12/20/2024    ALKPHOS 86 12/20/2024    K 4.2 12/20/2024    PROT 8.8 (H) 12/20/2024     12/20/2024    AST 24 12/20/2024    ALT 32 12/20/2024    BUN 8 12/20/2024    ANIONGAP 16 12/20/2024    MG 1.84 03/25/2021    PHOS 3.3 03/25/2021    ALBUMIN 4.3 12/20/2024    LIPASE 48 12/08/2021    GFRMALE >90 04/07/2023     Lab Results   Component Value Date    TRIG 192 (H) 06/06/2024    CHOL 194 06/06/2024    LDLCALC 122 (H) 06/06/2024    HDL 34.0 06/06/2024     Lab Results   Component Value Date    HGBA1C 8.6 (H) 12/20/2024       The 10-year ASCVD risk score (Erick MERIDA, et al., 2019) is: 17.9%    Values used to calculate the score:      Age: 50 years      Sex: Male      Is Non- : No      Diabetic: Yes      Tobacco smoker: No      Systolic Blood Pressure: 156 mmHg      Is BP treated: Yes      HDL Cholesterol: 34 mg/dL      Total Cholesterol: 194 mg/dL    Assessment & Plan  Type 2 diabetes mellitus with other specified complication, without long-term current use of insulin    Orders:    Follow Up In Endocrinology    Hemoglobin A1c; Future    Lipid Panel Non-Fasting; Future    Basic metabolic panel; Future    Follow Up In Endocrinology; Future    Hypertension, unspecified type    Orders:    Basic metabolic panel; Future    Obesity (BMI 30-39.9)         BMI 35.0-35.9,adult          Comment: A1c previously not at goal.  Due for updated labs.  He will plan to get next week.  Without data, unable to make changes today.  He is interested in doing the CGM trial.  Will plan nurse visit for placement next week.  I would also like to recheck his BP since being placed on losartan.  He is not currently checking via fingerstick and has no desire to check this way.  He asks about GLP1 and can consider this if metformin is not enough.  This would also help with CV risk reduction, A1c lowering, and weight reduction.  Also discussed changing metformin to 2 pills daily in the morning to  help with compliance.  On occasion he reports missing evening dose.       Plan:   Continue metformin   Change metformin to 2 pills daily with breakfast   Nurse visit scheduled for Monday 6/23 at 3pm for CGM trial and BP check   Follow up in 6 months            [1]   Current Outpatient Medications on File Prior to Visit   Medication Sig Dispense Refill    losartan (Cozaar) 50 mg tablet Take 1 tablet (50 mg) by mouth once daily. 90 tablet 1    metFORMIN XR (Glucophage-XR) 500 mg 24 hr tablet Take 1 tablet (500 mg) by mouth 2 times daily (morning and late afternoon). Do not crush, chew, or split. 180 tablet 3     No current facility-administered medications on file prior to visit.

## 2025-06-19 NOTE — Clinical Note
Patient seen virtually today.  Please assist with 6 month follow up in person or virtual, his choice.  Thanks.

## 2025-06-23 ENCOUNTER — TELEPHONE (OUTPATIENT)
Dept: ENDOCRINOLOGY | Facility: CLINIC | Age: 50
End: 2025-06-23

## 2025-06-23 ENCOUNTER — APPOINTMENT (OUTPATIENT)
Dept: ENDOCRINOLOGY | Facility: CLINIC | Age: 50
End: 2025-06-23
Payer: COMMERCIAL

## 2025-06-23 NOTE — TELEPHONE ENCOUNTER
Patient came into clinic today to have a Shekhar 3+ sensor placed on his right arm. Has c/o pain or discomfort. Patient has been educated on how the sensor works and feel comfortable wearing the sensor.

## 2025-08-29 ENCOUNTER — TELEPHONE (OUTPATIENT)
Dept: ENDOCRINOLOGY | Facility: CLINIC | Age: 50
End: 2025-08-29
Payer: COMMERCIAL

## 2025-09-02 ENCOUNTER — APPOINTMENT (OUTPATIENT)
Dept: ENDOCRINOLOGY | Facility: CLINIC | Age: 50
End: 2025-09-02
Payer: COMMERCIAL

## 2026-01-07 ENCOUNTER — APPOINTMENT (OUTPATIENT)
Dept: ENDOCRINOLOGY | Facility: CLINIC | Age: 51
End: 2026-01-07
Payer: COMMERCIAL

## 2026-02-12 ENCOUNTER — APPOINTMENT (OUTPATIENT)
Dept: OPHTHALMOLOGY | Facility: CLINIC | Age: 51
End: 2026-02-12
Payer: COMMERCIAL